# Patient Record
Sex: MALE | Race: WHITE | NOT HISPANIC OR LATINO | URBAN - METROPOLITAN AREA
[De-identification: names, ages, dates, MRNs, and addresses within clinical notes are randomized per-mention and may not be internally consistent; named-entity substitution may affect disease eponyms.]

---

## 2020-06-20 ENCOUNTER — HOSPITAL ENCOUNTER (INPATIENT)
Facility: HOSPITAL | Age: 40
LOS: 1 days | Discharge: HOME | End: 2020-06-21
Attending: EMERGENCY MEDICINE | Admitting: HOSPITALIST
Payer: MEDICAID

## 2020-06-20 ENCOUNTER — APPOINTMENT (EMERGENCY)
Dept: RADIOLOGY | Facility: HOSPITAL | Age: 40
End: 2020-06-20
Attending: EMERGENCY MEDICINE
Payer: MEDICAID

## 2020-06-20 DIAGNOSIS — I48.91 ATRIAL FIBRILLATION, UNSPECIFIED TYPE (CMS/HCC): Primary | ICD-10-CM

## 2020-06-20 PROBLEM — I10 ESSENTIAL HYPERTENSION: Status: ACTIVE | Noted: 2020-06-20

## 2020-06-20 LAB
ANION GAP SERPL CALC-SCNC: 13 MEQ/L (ref 3–15)
BASOPHILS # BLD: 0.03 K/UL (ref 0.01–0.1)
BASOPHILS NFR BLD: 0.7 %
BUN SERPL-MCNC: 6 MG/DL (ref 8–20)
CALCIUM SERPL-MCNC: 9.5 MG/DL (ref 8.9–10.3)
CHLORIDE SERPL-SCNC: 107 MEQ/L (ref 98–109)
CO2 SERPL-SCNC: 20 MEQ/L (ref 22–32)
CREAT SERPL-MCNC: 0.8 MG/DL (ref 0.8–1.3)
DIFFERENTIAL METHOD BLD: ABNORMAL
EOSINOPHIL # BLD: 0.11 K/UL (ref 0.04–0.54)
EOSINOPHIL NFR BLD: 2.5 %
ERYTHROCYTE [DISTWIDTH] IN BLOOD BY AUTOMATED COUNT: 12.5 % (ref 11.6–14.4)
GFR SERPL CREATININE-BSD FRML MDRD: >60 ML/MIN/1.73M*2
GLUCOSE SERPL-MCNC: 93 MG/DL (ref 70–99)
HCT VFR BLDCO AUTO: 43.4 % (ref 40.1–51)
HGB BLD-MCNC: 14.6 G/DL (ref 13.7–17.5)
IMM GRANULOCYTES # BLD AUTO: 0.01 K/UL (ref 0–0.08)
IMM GRANULOCYTES NFR BLD AUTO: 0.2 %
LYMPHOCYTES # BLD: 1.41 K/UL (ref 1.2–3.5)
LYMPHOCYTES NFR BLD: 31.8 %
MCH RBC QN AUTO: 33 PG (ref 28–33.2)
MCHC RBC AUTO-ENTMCNC: 33.6 G/DL (ref 32.2–36.5)
MCV RBC AUTO: 98 FL (ref 83–98)
MONOCYTES # BLD: 0.3 K/UL (ref 0.3–1)
MONOCYTES NFR BLD: 6.8 %
NEUTROPHILS # BLD: 2.58 K/UL (ref 1.7–7)
NEUTS SEG NFR BLD: 58 %
NRBC BLD-RTO: 0 %
PDW BLD AUTO: 9.7 FL (ref 9.4–12.4)
PLATELET # BLD AUTO: 216 K/UL (ref 150–350)
POTASSIUM SERPL-SCNC: 3.7 MEQ/L (ref 3.6–5.1)
RBC # BLD AUTO: 4.43 M/UL (ref 4.5–5.8)
SODIUM SERPL-SCNC: 140 MEQ/L (ref 136–144)
TROPONIN I SERPL-MCNC: <0.02 NG/ML
TSH SERPL DL<=0.05 MIU/L-ACNC: 1.47 MIU/L (ref 0.34–5.6)
WBC # BLD AUTO: 4.44 K/UL (ref 3.8–10.5)

## 2020-06-20 PROCEDURE — U0002 COVID-19 LAB TEST NON-CDC: HCPCS | Performed by: PHYSICIAN ASSISTANT

## 2020-06-20 PROCEDURE — 63600000 HC DRUGS/DETAIL CODE: Performed by: HOSPITALIST

## 2020-06-20 PROCEDURE — 21400000 HC ROOM AND CARE CCU/INTERMEDIATE

## 2020-06-20 PROCEDURE — 25000000 HC PHARMACY GENERAL: Performed by: HOSPITALIST

## 2020-06-20 PROCEDURE — 99285 EMERGENCY DEPT VISIT HI MDM: CPT | Mod: 25

## 2020-06-20 PROCEDURE — 96374 THER/PROPH/DIAG INJ IV PUSH: CPT

## 2020-06-20 PROCEDURE — 84443 ASSAY THYROID STIM HORMONE: CPT | Performed by: HOSPITALIST

## 2020-06-20 PROCEDURE — 84484 ASSAY OF TROPONIN QUANT: CPT | Performed by: PHYSICIAN ASSISTANT

## 2020-06-20 PROCEDURE — 63700000 HC SELF-ADMINISTRABLE DRUG: Performed by: HOSPITALIST

## 2020-06-20 PROCEDURE — 93005 ELECTROCARDIOGRAM TRACING: CPT | Performed by: PHYSICIAN ASSISTANT

## 2020-06-20 PROCEDURE — 25800000 HC PHARMACY IV SOLUTIONS: Performed by: PHYSICIAN ASSISTANT

## 2020-06-20 PROCEDURE — 63700000 HC SELF-ADMINISTRABLE DRUG: Performed by: INTERNAL MEDICINE

## 2020-06-20 PROCEDURE — 71046 X-RAY EXAM CHEST 2 VIEWS: CPT

## 2020-06-20 PROCEDURE — 85025 COMPLETE CBC W/AUTO DIFF WBC: CPT | Performed by: PHYSICIAN ASSISTANT

## 2020-06-20 PROCEDURE — 25000000 HC PHARMACY GENERAL: Performed by: PHYSICIAN ASSISTANT

## 2020-06-20 PROCEDURE — 99222 1ST HOSP IP/OBS MODERATE 55: CPT | Performed by: HOSPITALIST

## 2020-06-20 PROCEDURE — 36415 COLL VENOUS BLD VENIPUNCTURE: CPT | Performed by: EMERGENCY MEDICINE

## 2020-06-20 PROCEDURE — 80048 BASIC METABOLIC PNL TOTAL CA: CPT | Performed by: PHYSICIAN ASSISTANT

## 2020-06-20 RX ORDER — ENOXAPARIN SODIUM 100 MG/ML
40 INJECTION SUBCUTANEOUS
Status: DISCONTINUED | OUTPATIENT
Start: 2020-06-20 | End: 2020-06-21 | Stop reason: HOSPADM

## 2020-06-20 RX ORDER — SENNOSIDES 8.6 MG/1
1 TABLET ORAL 2 TIMES DAILY PRN
Status: DISCONTINUED | OUTPATIENT
Start: 2020-06-20 | End: 2020-06-21 | Stop reason: HOSPADM

## 2020-06-20 RX ORDER — DEXTROSE 50 % IN WATER (D50W) INTRAVENOUS SYRINGE
25 AS NEEDED
Status: DISCONTINUED | OUTPATIENT
Start: 2020-06-20 | End: 2020-06-21 | Stop reason: HOSPADM

## 2020-06-20 RX ORDER — ONDANSETRON 4 MG/1
4 TABLET, ORALLY DISINTEGRATING ORAL EVERY 8 HOURS PRN
Status: DISCONTINUED | OUTPATIENT
Start: 2020-06-20 | End: 2020-06-21 | Stop reason: HOSPADM

## 2020-06-20 RX ORDER — DILTIAZEM HCL IN NACL,ISO-OSM 125 MG/125
10 PLASTIC BAG, INJECTION (ML) INTRAVENOUS
Status: DISCONTINUED | OUTPATIENT
Start: 2020-06-20 | End: 2020-06-20

## 2020-06-20 RX ORDER — DILTIAZEM HCL IN NACL,ISO-OSM 125 MG/125
5-15 PLASTIC BAG, INJECTION (ML) INTRAVENOUS
Status: DISCONTINUED | OUTPATIENT
Start: 2020-06-20 | End: 2020-06-20

## 2020-06-20 RX ORDER — ALUMINUM HYDROXIDE, MAGNESIUM HYDROXIDE, AND SIMETHICONE 1200; 120; 1200 MG/30ML; MG/30ML; MG/30ML
30 SUSPENSION ORAL EVERY 4 HOURS PRN
Status: DISCONTINUED | OUTPATIENT
Start: 2020-06-20 | End: 2020-06-21 | Stop reason: HOSPADM

## 2020-06-20 RX ORDER — DILTIAZEM HYDROCHLORIDE 30 MG/1
30 TABLET, FILM COATED ORAL EVERY 6 HOURS
Status: DISCONTINUED | OUTPATIENT
Start: 2020-06-20 | End: 2020-06-21 | Stop reason: HOSPADM

## 2020-06-20 RX ORDER — IBUPROFEN 200 MG
16-32 TABLET ORAL AS NEEDED
Status: DISCONTINUED | OUTPATIENT
Start: 2020-06-20 | End: 2020-06-21 | Stop reason: HOSPADM

## 2020-06-20 RX ORDER — DILTIAZEM HYDROCHLORIDE 30 MG/1
30 TABLET, FILM COATED ORAL EVERY 6 HOURS
Status: DISCONTINUED | OUTPATIENT
Start: 2020-06-20 | End: 2020-06-20

## 2020-06-20 RX ORDER — DEXTROSE 40 %
15-30 GEL (GRAM) ORAL AS NEEDED
Status: DISCONTINUED | OUTPATIENT
Start: 2020-06-20 | End: 2020-06-21 | Stop reason: HOSPADM

## 2020-06-20 RX ORDER — ASPIRIN 325 MG
325 TABLET, DELAYED RELEASE (ENTERIC COATED) ORAL DAILY
Status: DISCONTINUED | OUTPATIENT
Start: 2020-06-20 | End: 2020-06-21 | Stop reason: HOSPADM

## 2020-06-20 RX ADMIN — ASPIRIN 325 MG: 325 TABLET, DELAYED RELEASE ORAL at 18:36

## 2020-06-20 RX ADMIN — ENOXAPARIN SODIUM 40 MG: 40 INJECTION SUBCUTANEOUS at 18:36

## 2020-06-20 RX ADMIN — Medication 10 MG/HR: at 18:37

## 2020-06-20 RX ADMIN — DILTIAZEM HYDROCHLORIDE 30 MG: 30 TABLET, FILM COATED ORAL at 22:15

## 2020-06-20 RX ADMIN — Medication 10 MG/HR: at 14:32

## 2020-06-20 RX ADMIN — SODIUM CHLORIDE 1000 ML: 9 INJECTION, SOLUTION INTRAVENOUS at 13:35

## 2020-06-20 SDOH — HEALTH STABILITY: MENTAL HEALTH: HOW OFTEN DO YOU HAVE SIX OR MORE DRINKS ON ONE OCCASION?: NEVER

## 2020-06-20 SDOH — HEALTH STABILITY: MENTAL HEALTH: HOW OFTEN DO YOU HAVE A DRINK CONTAINING ALCOHOL?: 2-4 TIMES A MONTH

## 2020-06-20 SDOH — HEALTH STABILITY: MENTAL HEALTH: HOW MANY DRINKS CONTAINING ALCOHOL DO YOU HAVE ON A TYPICAL DAY WHEN YOU ARE DRINKING?: 1 OR 2

## 2020-06-20 ASSESSMENT — COGNITIVE AND FUNCTIONAL STATUS - GENERAL
MOVING TO AND FROM BED TO CHAIR: 4 - NONE
TOILETING: 4 - NONE
DRESSING REGULAR UPPER BODY CLOTHING: 4 - NONE
STANDING UP FROM CHAIR USING ARMS: 4 - NONE
HELP NEEDED FOR BATHING: 4 - NONE
EATING MEALS: 4 - NONE
DRESSING REGULAR LOWER BODY CLOTHING: 4 - NONE
HELP NEEDED FOR PERSONAL GROOMING: 4 - NONE
WALKING IN HOSPITAL ROOM: 4 - NONE
CLIMB 3 TO 5 STEPS WITH RAILING: 4 - NONE

## 2020-06-20 NOTE — ASSESSMENT & PLAN NOTE
Patient with rapid A. fib.  Heart rate up to 120s per my discussion with PA in the emergency room.  Patient was started on a Cardizem drip and his heart rate is better controlled around 90s and he feels better.  No known history of A. fib.  Does feel anxious at times.    TSH within normal limits  Continue aspirin  Seen by cardiology today and I discussed with Dr. Pelletier as well.  Trial of oral flecainide to cardiovert.  Patient did go back into sinus rhythm this afternoon.  We will discharge the patient home with flecainide as needed  He will follow-up as an outpatient to pursue further studies which may include coronary calcium score, echocardiogram, stress test, sleep study and outpatient heart monitoring.  He will either follow-up with Dr. Pelletier or a cardiologist in [place] depending on his insurance situation.

## 2020-06-20 NOTE — ED ATTESTATION NOTE
I have personally seen and examined the patient.  I reviewed and agree with physician assistant / nurse practitioner’s assessment and plan of care.     Exam: Patient is currently resting comfortably no acute distress.  He is tachycardic with an irregularly irregular rhythm and no murmur.  Lungs are clear to auscultation without wheezes or rales.  Patient is awake alert and oriented and neurologically intact throughout.  Other than his tachycardia the remainder of his vital signs are stable and he is afebrile.    Plan: Patient presents with new onset rapid A. fib.  Patient denies chest pain or shortness of breath.  Will check labs including troponin.  Will treat with IV Cardizem and consult cardiology to evaluate.  Patient is known to Armand Harmon,   06/20/20 3183

## 2020-06-20 NOTE — PLAN OF CARE
Problem: Adult Inpatient Plan of Care  Goal: Plan of Care Review  Outcome: Progressing  Flowsheets (Taken 6/20/2020 9971)  Plan of Care Reviewed With: patient  Outcome Summary: On cardizem gtt at 10mg/hr

## 2020-06-20 NOTE — ASSESSMENT & PLAN NOTE
Had HTN in past and was on Ace-I.  Stopped a while ago due to improvement in BP.  Checks his BP at home daily and gets 130s/80s  + Intentional wt loss in last few months of ~15 lbs.  Blood pressures improved from yesterday.  Outpatient follow-up

## 2020-06-20 NOTE — NURSING NOTE
Patient received from ER on cardizem drip at 10mg/hr, HR in controlled Afib. Patient oriented to the room, questions answered.

## 2020-06-20 NOTE — H&P
Hospital Medicine Service -  History & Physical        CHIEF COMPLAINT   Rapid heart beat/palpitations     HISTORY OF PRESENT ILLNESS      Epifanio Vasquez is a 40 y.o. male with a past medical who presents with rapid atrial fibrillation.  He was in his usual state of health until about midnight last night.  He was going to bed and noticed some palpitations.  He has reported palpitations intermittently in the past which she attributes to stress.  He did get checked once and had an EKG in January which showed a sinus rhythm.  He was able to sleep and then woke up this morning still with palpitations.  He has a blood pressure cuff at home and checks his blood pressure daily.  When he checked his blood pressure this morning, he got around 130/80 but noticed the icon for irregular heartbeat was lit.    He continues to feel palpitations and feels like his chest was beating hard and just did not feel right.  He went to the urgent care center and they checked an EKG.  He was told that he had atrial flutter and was referred here to the emergency room.    He had an EKG in the emergency room suggesting A. fib.  He was started on a Cardizem drip and his heart rate is improving.  Per my discussion with the physician assistant in the emergency room, his heart rate was in the 120s.  During my interview his heart rate improved into the 90s.  His symptoms have improved as well.  He was denying any palpitations at this time.  He denied any chest pains or shortness of breath throughout.    He has no other symptoms.  He denies fevers, lightheadedness, dizziness, cough, urinary complaints.    He reports a history of hypertension and was on an ACE inhibitor but was stopped a while ago by his outpatient doctors due to improvement.  He also admits to recent intentional weight loss of 15 pounds.  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    PAST MEDICAL AND SURGICAL HISTORY      History reviewed. No pertinent past  medical history.    History reviewed. No pertinent surgical history.    PCP: [facility], No Pcp    MEDICATIONS      Prior to Admission medications    Not on File   NONE    ALLERGIES      Patient has no known allergies.    FAMILY HISTORY      Reviewed family history.  Father has A. fib and CAD    SOCIAL HISTORY      Social History     Socioeconomic History   • Marital status: Single     Spouse name: None   • Number of children: None   • Years of education: None   • Highest education level: None   Occupational History   • None   Social Needs   • Financial resource strain: None   • Food insecurity:     Worry: None     Inability: None   • Transportation needs:     Medical: None     Non-medical: None   Tobacco Use   • Smoking status: Never Smoker   • Smokeless tobacco: Never Used   Substance and Sexual Activity   • Alcohol use: Yes   • Drug use: Never   • Sexual activity: None   Lifestyle   • Physical activity:     Days per week: None     Minutes per session: None   • Stress: None   Relationships   • Social connections:     Talks on phone: None     Gets together: None     Attends [employer] service: None     Active member of club or organization: None     Attends meetings of clubs or organizations: None     Relationship status: None   • Intimate partner violence:     Fear of current or ex partner: None     Emotionally abused: None     Physically abused: None     Forced sexual activity: None   Other Topics Concern   • None   Social History Narrative   • None       REVIEW OF SYSTEMS      All other systems reviewed and negative except as noted in HPI    PHYSICAL EXAMINATION      Temp:  [36.7 °C (98.1 °F)] 36.7 °C (98.1 °F)  Heart Rate:  [] 79  Resp:  [18] 18  BP: (140-161)/() 161/94  There is no height or weight on file to calculate BMI.    Physical Exam   Visit Vitals  BP (!) 161/94   Pulse 79   Temp 36.7 °C (98.1 °F) (Oral)   Resp 18   SpO2 98%       Physical Exam  General Appearance:        Awake, Alert,  Oriented x3   Head:    Normocephalic, without obvious abnormality, atraumatic   Eyes:    PERRL, EOM's intact, No icterus   Throat:   Mucous membranes moist   Respiratory:     Clear to auscultation bilaterally   Cardiovascular:    Regular rate with an irregular rhythm   GI:     Soft, non-tender, bowel sounds active    Musculoskeletal   Symmetric strength bilateral upper and lower ext   Skin:   No diffuse rash on exposed areas of skin   Neurologic:  Psychiatric   CNII-XII grossly intact.     cooperative, slightly anxious         LABS / IMAGING / EKG        Labs  Results from last 7 days   Lab Units 06/20/20  1331   WBC K/uL 4.44   HEMOGLOBIN g/dL 14.6   HEMATOCRIT % 43.4   PLATELETS K/uL 216             Imaging  X-ray-No acute infectious or inflammatory lung consolidations    ECG/Telemetry  EKG from today was independent reviewed by me.  Shows atrial fibrillation at 104 bpm.    ASSESSMENT AND PLAN           * Atrial fibrillation (CMS/HCC)  Assessment & Plan  Patient with rapid A. fib.  Heart rate up to 120s per my discussion with PA in the emergency room.  Patient was started on a Cardizem drip and his heart rate is better controlled around 90s and he feels better.  No known history of A. fib.  Does feel anxious at times.  Check TSH.  Denies recent alcohol use.  Denies any drug use.  No respiratory symptoms.  Eventual echo-consider for Monday if patient still here.  CHADS2 VASC score is 0-1  Start aspirin  Check lipids and hepatic function panel  Cardiology consult.  Discussed potential management options with patient depending on his clinical course which could include spontaneous cardioversion, chemical/electrical cardioversion, rate control, ablation, anticoagulation    Essential hypertension  Assessment & Plan  Had HTN in past and was on Ace-I.  Stopped a while ago due to improvement in BP.  Checks his BP at home daily and gets 130s/80s  + Intentional wt loss in last few months of ~15 lbs.  Blood pressure elevated  here.  Could also be due to anxiety.  He is on a Cardizem drip.  Continue to monitor blood pressure.  He may eventually end up on an oral agent for A. fib which may also be effective in blood pressure management         VTE Assessment: [place] VTE Score: 0  VTE Prophylaxis Plan: lovenox  Code Status: Full Code  Estimated Discharge Date: 6/22/2020  Disposition Planning: home     Ashok Valente,   6/20/2020

## 2020-06-21 VITALS
TEMPERATURE: 98.1 F | RESPIRATION RATE: 16 BRPM | DIASTOLIC BLOOD PRESSURE: 82 MMHG | BODY MASS INDEX: 22.35 KG/M2 | HEIGHT: 72 IN | OXYGEN SATURATION: 97 % | SYSTOLIC BLOOD PRESSURE: 138 MMHG | HEART RATE: 62 BPM | WEIGHT: 165 LBS

## 2020-06-21 PROBLEM — E78.5 HYPERLIPIDEMIA: Status: ACTIVE | Noted: 2020-06-21

## 2020-06-21 LAB
ALBUMIN SERPL-MCNC: 4.2 G/DL (ref 3.4–5)
ALP SERPL-CCNC: 45 IU/L (ref 35–126)
ALT SERPL-CCNC: 18 IU/L (ref 16–63)
ANION GAP SERPL CALC-SCNC: 8 MEQ/L (ref 3–15)
APTT PPP: 35 SEC (ref 23–35)
AST SERPL-CCNC: 22 IU/L (ref 15–41)
ATRIAL RATE: 119
BILIRUB SERPL-MCNC: 0.9 MG/DL (ref 0.3–1.2)
BUN SERPL-MCNC: 5 MG/DL (ref 8–20)
CALCIUM SERPL-MCNC: 9.6 MG/DL (ref 8.9–10.3)
CHLORIDE SERPL-SCNC: 104 MEQ/L (ref 98–109)
CHOLEST SERPL-MCNC: 257 MG/DL
CO2 SERPL-SCNC: 27 MEQ/L (ref 22–32)
CREAT SERPL-MCNC: 0.8 MG/DL (ref 0.8–1.3)
ERYTHROCYTE [DISTWIDTH] IN BLOOD BY AUTOMATED COUNT: 12.5 % (ref 11.6–14.4)
GFR SERPL CREATININE-BSD FRML MDRD: >60 ML/MIN/1.73M*2
GLUCOSE SERPL-MCNC: 87 MG/DL (ref 70–99)
HCT VFR BLDCO AUTO: 42.4 % (ref 40.1–51)
HDLC SERPL-MCNC: 86 MG/DL
HDLC SERPL: 3 {RATIO}
HGB BLD-MCNC: 14.2 G/DL (ref 13.7–17.5)
INR PPP: 1.1 INR
LDLC SERPL CALC-MCNC: 159 MG/DL
MCH RBC QN AUTO: 32.8 PG (ref 28–33.2)
MCHC RBC AUTO-ENTMCNC: 33.5 G/DL (ref 32.2–36.5)
MCV RBC AUTO: 97.9 FL (ref 83–98)
NONHDLC SERPL-MCNC: 171 MG/DL
PDW BLD AUTO: 10 FL (ref 9.4–12.4)
PLATELET # BLD AUTO: 207 K/UL (ref 150–350)
POTASSIUM SERPL-SCNC: 4 MEQ/L (ref 3.6–5.1)
PROT SERPL-MCNC: 6.7 G/DL (ref 6–8.2)
PROTHROMBIN TIME: 14 SEC (ref 12.2–14.5)
QRS DURATION: 72
QT INTERVAL: 358
QTC CALCULATION(BAZETT): 470
R AXIS: -22
RBC # BLD AUTO: 4.33 M/UL (ref 4.5–5.8)
SARS-COV-2 RNA RESP QL NAA+PROBE: NOT DETECTED
SODIUM SERPL-SCNC: 139 MEQ/L (ref 136–144)
T WAVE AXIS: 7
TRIGL SERPL-MCNC: 58 MG/DL (ref 30–149)
VENTRICULAR RATE: 104
WBC # BLD AUTO: 6.4 K/UL (ref 3.8–10.5)

## 2020-06-21 PROCEDURE — 63700000 HC SELF-ADMINISTRABLE DRUG: Performed by: INTERNAL MEDICINE

## 2020-06-21 PROCEDURE — 85730 THROMBOPLASTIN TIME PARTIAL: CPT | Performed by: HOSPITALIST

## 2020-06-21 PROCEDURE — 85027 COMPLETE CBC AUTOMATED: CPT | Performed by: HOSPITALIST

## 2020-06-21 PROCEDURE — 36415 COLL VENOUS BLD VENIPUNCTURE: CPT | Performed by: HOSPITALIST

## 2020-06-21 PROCEDURE — 63700000 HC SELF-ADMINISTRABLE DRUG: Performed by: HOSPITALIST

## 2020-06-21 PROCEDURE — 99239 HOSP IP/OBS DSCHRG MGMT >30: CPT | Performed by: HOSPITALIST

## 2020-06-21 PROCEDURE — 85610 PROTHROMBIN TIME: CPT | Performed by: HOSPITALIST

## 2020-06-21 PROCEDURE — 80061 LIPID PANEL: CPT | Performed by: HOSPITALIST

## 2020-06-21 PROCEDURE — 80053 COMPREHEN METABOLIC PANEL: CPT | Performed by: HOSPITALIST

## 2020-06-21 PROCEDURE — 93005 ELECTROCARDIOGRAM TRACING: CPT | Performed by: HOSPITALIST

## 2020-06-21 RX ORDER — ASPIRIN 325 MG
325 TABLET, DELAYED RELEASE (ENTERIC COATED) ORAL DAILY
Qty: 30 TABLET | Refills: 0 | Status: SHIPPED | OUTPATIENT
Start: 2020-06-22 | End: 2020-07-22

## 2020-06-21 RX ORDER — FLECAINIDE ACETATE 100 MG/1
200 TABLET ORAL DAILY PRN
Qty: 30 TABLET | Refills: 0 | Status: SHIPPED | OUTPATIENT
Start: 2020-06-21 | End: 2020-07-21

## 2020-06-21 RX ORDER — FLECAINIDE ACETATE 100 MG/1
200 TABLET ORAL ONCE
Status: COMPLETED | OUTPATIENT
Start: 2020-06-21 | End: 2020-06-21

## 2020-06-21 RX ADMIN — ASPIRIN 325 MG: 325 TABLET, DELAYED RELEASE ORAL at 08:46

## 2020-06-21 RX ADMIN — DILTIAZEM HYDROCHLORIDE 30 MG: 30 TABLET, FILM COATED ORAL at 05:43

## 2020-06-21 RX ADMIN — FLECAINIDE ACETATE 200 MG: 100 TABLET ORAL at 11:35

## 2020-06-21 ASSESSMENT — ENCOUNTER SYMPTOMS
DIZZINESS: 0
FEVER: 0
ABDOMINAL PAIN: 0
CHILLS: 0
SHORTNESS OF BREATH: 0
PALPITATIONS: 1
COUGH: 0
LIGHT-HEADEDNESS: 0
NAUSEA: 0
VOMITING: 0
FATIGUE: 1

## 2020-06-21 NOTE — ASSESSMENT & PLAN NOTE
Reviewed results with patient.   He exercises already and not overweight.  T/c calcium score. I encouraged him to d/w his cardiologist.

## 2020-06-21 NOTE — CONSULTS
"Cardiology Consult Note  Subjective     Epifanio Vasquez is a 40 y.o. male who was admitted for Atrial fibrillation, unspecified type (CMS/HCC) [I48.91]. Epifanio Vasquez was  for management recommendations. Epifanio Vasquez is a 41 yo M with h/o borderline hypertension and hyperlipidemia who presents with new onset rapid Afib. 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    He started having palpitations on the evening of 6/19, went to sleep and woke up with continued palpitations yesterday AM. Found to be in Afib in the 120s in the ED. Initially started on cardizem gtt, switched to cardizem 30 po q6h and heart rates reasonably controlled overnight.    He has had intermittent palpitations over the years in retrospect. Typically brief, lasting a few minutes at a time -- attributed to a \"panic attack\" in Jan when he went to urgent care. He has also noticed mild brief palpitations the morning after drinking alcohol over the years.     Outside records reviewed..    History reviewed. No pertinent past medical history.    History reviewed. No pertinent surgical history.    Social History     Social History Narrative   • Not on file       History reviewed. No pertinent family history.    Patient has no known allergies.    Inpatient medications:  •  alum-mag hydroxide-simeth, 30 mL, oral, q4h PRN  •  aspirin, 325 mg, oral, Daily  •  glucose, 16-32 g of dextrose, oral, PRN **OR** dextrose, 15-30 g of dextrose, oral, PRN **OR** glucagon, 1 mg, intramuscular, PRN **OR** dextrose in water, 25 mL, intravenous, PRN  •  dilTIAZem, 30 mg, oral, q6h JUSTUS  •  enoxaparin, 40 mg, subcutaneous, Daily (6p)  •  flecainide, 200 mg, oral, Once  •  ondansetron ODT, 4 mg, oral, q8h PRN  •  senna, 1 tablet, oral, 2x daily PRN    Outpatient medications: Reviewed.    Review of Systems  Pertinent items are noted in HPI.    Objective     Labs   Lab Results   Component Value Date    WBC 6.40 " "06/21/2020    HGB 14.2 06/21/2020    HCT 42.4 06/21/2020     06/21/2020    CHOL 257 (H) 06/21/2020    TRIG 58 06/21/2020    HDL 86 06/21/2020    ALT 18 06/21/2020    AST 22 06/21/2020     06/21/2020    K 4.0 06/21/2020     06/21/2020    CREATININE 0.8 06/21/2020    BUN 5 (L) 06/21/2020    CO2 27 06/21/2020    TSH 1.47 06/20/2020    INR 1.1 06/21/2020       Imaging  I have independently reviewed the pertinent imaging from the last 24 hrs.    ECG/Telemetry  EKG 6/20/20 at 1320: Afib at 104 bpm    Physical Exam:  Visit Vitals  /78 (BP Location: Left upper arm, Patient Position: Lying)   Pulse 70   Temp 36.6 °C (97.8 °F) (Oral)   Resp 18   Ht 1.829 m (6')   Wt 74.8 kg (165 lb)   SpO2 100%   BMI 22.38 kg/m²        GEN - No acute distress  HEENT - Oropharynx clear, moist mucous membranes  CV - Regular S1,S2, No murmurs, gallops or rubs. No carotid bruits.  PULM - Clear to auscultation bilaterally  ABD - Soft, Non-tender, non-distended. +bowel sounds  EXT - No clubbing, cyanosis or edema  PSYCH - Awake, alert, oriented x 3  SKIN - Warm/Dry.      Assessment   40 y.o. male being consulted for management recommendations  Hyperlipidemia  Assessment & Plan    Would plan for eventual CCS as outpatient    Essential hypertension  Assessment & Plan  BP elevated on admission due to anxiety  Well controlled currently on po cardizem  If he converts to sinus rhythm, will likely not be able to tolerate cardizem due to baseline sinus bradycardia in the past  Would monitor off meds    * Atrial fibrillation (CMS/[facility])  Assessment & Plan  New onset Afib with rapid rates in the 120s on presentation in the ED  Currently reasonably rate controlled on cardizem 30 q6h  He has had intermittent symptoms of palpitations over the years in retrospect. Urgent care visit in Jan with palpitations dx as \"anxiety attack\", was likely transient Afib  Will try flecainide 200mg x 1 now --> if he remains in Afib, plan for " "BRIGITTE-guided cardioversion tomorrow (and then 1 month of AC)  If he converts with flecainide, would plan for DC home today with aspirin 325mg daily (given CHADS-VASc score of 0) and \"pill in pocket\" flecainide 200mg x 1 PRN for palpitations.     He will need followup for echo, stress test, sleep study and outpatient heart monitoring  If recurrent episodes, low threshold to pursue ablation in young, healthy patient to avoid long-term antiarrhythmics  With his out of state insurance, he plans to followup in [facility] for this            Saniya Pelletier MD  6/21/2020    "

## 2020-06-21 NOTE — ED PROVIDER NOTES
"HPI     Chief Complaint   Patient presents with   • Atrial Flutter       40-year-old male no past medical history presents ED for palpitations.  Patient reports since midnight he has been experiencing sudden onset of palpitations and \"feeling unwell\".  Patient reports he is had episodes in the past with his palpitations but has not had a diagnosis.  Patient reports he is from [facility] visiting his father.  He was outside today gardening and yesterday was doing some exercise which she has not done in significant bedtime although at baseline patient does exercise regularly.  Patient reports he noticed palpitations when attempting to go to bed.  Reports they came on and off throughout the day today.  He went to urgent care who referred him to ED for possible new onset A. fib.  Patient reports at time of ED he does feel a \"anxious\" feeling with associated palpitations.  He denies any chest pain, shortness of breath, cough, fevers, chills, lightheadedness or dizziness.    Father has a history of A. fib.  Patient is never seen a cardiologist before.           Patient History     History reviewed. No pertinent past medical history.    History reviewed. No pertinent surgical history.    History reviewed. No pertinent family history.    Social History     Tobacco Use   • Smoking status: Never Smoker   • Smokeless tobacco: Never Used   Substance Use Topics   • Alcohol use: Yes     Frequency: 2-4 times a month     Drinks per session: 1 or 2     Binge frequency: Never   • Drug use: Never       Systems Reviewed from Nursing Triage:  Meds          Review of Systems     Review of Systems   Constitutional: Positive for fatigue. Negative for chills and fever.   Respiratory: Negative for cough and shortness of breath.    Cardiovascular: Positive for palpitations. Negative for chest pain and leg swelling.   Gastrointestinal: Negative for abdominal pain, nausea and vomiting.   Neurological: Negative for dizziness and " light-headedness.        Physical Exam     ED Triage Vitals   Temp Heart Rate Resp BP SpO2   06/20/20 1340 06/20/20 1340 06/20/20 1340 06/20/20 1340 06/20/20 1340   36.7 °C (98.1 °F) (!) 107 18 (!) 151/111 100 %      Temp Source Heart Rate Source Patient Position BP Location FiO2 (%) (Set)   06/20/20 1340 06/20/20 1636 06/20/20 1636 06/20/20 1340 --   Oral Monitor Lying Right upper arm        Pulse Ox %: 100 % (06/20/20 1356)  Pulse Ox Interpretation: Normal (06/20/20 1356)  Heart Rate: 107 (06/20/20 1356)  Rhythm Strip Interpretation: Atrial Fibrillation (06/20/20 1356)    Patient Vitals for the past 24 hrs:   BP Temp Temp src Pulse Resp SpO2 Height Weight   06/20/20 2200 129/72 36.8 °C (98.2 °F) Oral 60 18 98 % -- --   06/20/20 2145 -- -- -- (!) 53 -- -- -- --   06/20/20 1945 -- 37.1 °C (98.7 °F) Oral -- 18 98 % -- --   06/20/20 1943 -- -- -- 62 -- -- -- --   06/20/20 1922 125/76 -- -- -- -- -- -- --   06/20/20 1700 133/85 37 °C (98.6 °F) Oral 70 18 96 % 1.829 m (6') 74.8 kg (165 lb)   06/20/20 1636 -- 37 °C (98.6 °F) Oral -- 18 96 % -- --   06/20/20 1621 (!) 161/94 -- Oral 79 18 98 % -- --   06/20/20 1503 140/88 -- -- 84 18 99 % -- --   06/20/20 1432 (!) 148/106 -- -- (!) 105 -- -- -- --   06/20/20 1340 (!) 151/111 36.7 °C (98.1 °F) Oral (!) 107 18 100 % -- --                                          Physical Exam   Constitutional: He appears well-developed and well-nourished.   Well-appearing healthy male slightly anxious upon examination.   HENT:   Head: Normocephalic and atraumatic.   Eyes: Conjunctivae are normal.   Neck: Neck supple.   Cardiovascular: Normal rate and regular rhythm.   No murmur heard.  Pulmonary/Chest: Effort normal and breath sounds normal. No respiratory distress.   Abdominal: Soft. There is no tenderness.   Musculoskeletal: He exhibits no edema.   Neurological: He is alert.   Skin: Skin is warm and dry.   Psychiatric: He has a normal mood and affect.   Nursing note and vitals  reviewed.           Procedures    Labs Reviewed   CBC AND DIFF - Abnormal       Result Value    WBC 4.44      RBC 4.43 (*)     Hemoglobin 14.6      Hematocrit 43.4      MCV 98.0      MCH 33.0      MCHC 33.6      RDW 12.5      Platelets 216      MPV 9.7      Differential Type Auto      nRBC 0.0      Immature Granulocytes 0.2      Neutrophils 58.0      Lymphocytes 31.8      Monocytes 6.8      Eosinophils 2.5      Basophils 0.7      Immature Granulocytes, Absolute 0.01      Neutrophils, Absolute 2.58      Lymphocytes, Absolute 1.41      Monocytes, Absolute 0.30      Eosinophils, Absolute 0.11      Basophils, Absolute 0.03     BASIC METABOLIC PANEL - Abnormal    Sodium 140      Potassium 3.7      Chloride 107      CO2 20 (*)     BUN 6 (*)     Creatinine 0.8      Glucose 93      Calcium 9.5      eGFR >60.0      Anion Gap 13     TROPONIN I - Normal    Troponin I <0.02     TSH 3RD GENERATION W/REFLEX FT4 - Normal    TSH 1.47     SARS-COV-2 (COVID 19), PCR    Narrative:     The following orders were created for panel order SARS-CoV-2 (COVID-19), PCR Nasopharynx.  Procedure                               Abnormality         Status                     ---------                               -----------         ------                     COVID-19 [facility] SARS-COV-...[[id number]]                      In process                   Please view results for these tests on the individual orders.   RAINBOW DRAW PANEL    Narrative:     The following orders were created for panel order [place] Draw Panel.  Procedure                               Abnormality         Status                     ---------                               -----------         ------                     RAINBOW LAVENDER[[id number]]                                 Final result               RAINBOW LT BLUE[[id number]]                                  Final result               RAINBOW LT GREEN[[id number]]                                 Final result               RAINBOW  LT GREEN[[id number]]                                 Final result                 Please view results for these tests on the individual orders.   RAINBOW DRAW PANEL    Narrative:     The following orders were created for panel order [place] Draw Panel.  Procedure                               Abnormality         Status                     ---------                               -----------         ------                     RAINBOW RED[[id number]]                                                                 RAINBOW LAVENDER[[id number]]                                                            RAINBOW LT BLUE[[id number]]                                                             RAINBOW LT GREEN[[id number]]                                                            RAINBOW LT GREEN[[id number]]                                                              Please view results for these tests on the individual orders.   COVID-19 [facility] SARS-COV-2 RNA, QUALITATIVE NAAT   RAINBOW LAVENDER   RAINBOW LT BLUE   RAINBOW LT GREEN   RAINBOW LT GREEN   RAINBOW RED   RAINBOW LAVENDER   RAINBOW LT BLUE   RAINBOW LT GREEN   RAINBOW LT GREEN       X-RAY CHEST 2 VIEWS   Final Result   IMPRESSION:      No acute infectious or inflammatory lung consolidations..      ECG 12 lead                     ED Course & MDM     MDM         ED Course as of Jun 20 2315   Sat Jun 20, 2020   1418 Patient presents with rapid A. fib which is new onset beginning yesterday.  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    [SACHI]   1418 Will treat with Cardizem infusion.    [SACHI]   1418 Will check labs including troponin.    [SACHI]   1418 Will consult cardiology to evaluate    [SACHI]   1428 IMPRESSION:     No acute infectious or inflammatory lung consolidations..    [EB]   1449 Dr. Nicholas d/w Dr. Lopes. Will admit under Dr. Lopes with [facility] doing admit orders.     [EB]   1451 Patient remains in the low 100s and irregularly irregular on  the Cardizem drip.  We will continue the drip as per discussion with Dr. Lopes from cardiology.  He recommends hospital admission for formal work-up and rule out    [SACHI]   9411 Critical care time 35 minutes provided by me    [SACHI]   9014 Page to [facility]    [EB]      ED Course User Index  [EB] Nuria Hoffman PA C  [SACHI] Armand Nicholas,          Clinical Impressions as of Jun 20 2310   Atrial fibrillation, unspecified type (CMS/HCC)        Nuria Hoffman PA C  06/21/20 1141

## 2020-06-21 NOTE — PLAN OF CARE
Problem: Adult Inpatient Plan of Care  Goal: Plan of Care Review  Outcome: Progressing  Flowsheets  Taken 6/21/2020 0583  Progress: no change  Outcome Summary: Cardizem gtt D/C d/t low heart rate, PO cardizem started.  Pt continues with Afib.  Taken 6/20/2020 3554  Plan of Care Reviewed With: patient

## 2020-06-21 NOTE — DISCHARGE INSTRUCTIONS
Follow up with a cardiologist within a week for further testing which may include coronary calcium score, echocardiogram, stress test, sleep study and outpatient heart monitoring.      Try to avoid alcohol and caffeine until you get other tests and discuss with cardiology.

## 2020-06-21 NOTE — NURSING NOTE
HR sustaining in the 50s.  Notified cardiology.  Dr. Lopes called back and gave verbal orders to orders start pt on PO cardizem 30mg Q6 hours starting now and then to turn off gtt in 30mins.  Verbal orders entered with readback given.  Readback confirmed.  Dr. Mahajan paged and made aware of new orders.

## 2020-06-21 NOTE — DISCHARGE SUMMARY
Hospital Medicine Service -  Inpatient Discharge Summary        BRIEF OVERVIEW   Admitting Provider: Ashok Valente DO  Attending Provider: Ashok Valente DO Attending phys phone: [phone]    PCP: Edin States, No Pcp [phone]    Admission Date: 6/20/2020  Discharge Date: 6/21/2020     DISCHARGE DIAGNOSES      Primary Discharge Diagnosis  Atrial fibrillation (CMS/HCC)    Secondary Discharge Diagnoses  Active Hospital Problems    Diagnosis Date Noted   • Atrial fibrillation (CMS/HCC) 06/20/2020     Priority: High   • Hyperlipidemia 06/21/2020   • Essential hypertension 06/20/2020      Resolved Hospital Problems   No resolved problems to display.       Problem List on Day of Discharge  * Atrial fibrillation (CMS/HCC)  Assessment & Plan  Patient with rapid A. fib.  Heart rate up to 120s per my discussion with PA in the emergency room.  Patient was started on a Cardizem drip and his heart rate is better controlled around 90s and he feels better.  No known history of A. fib.  Does feel anxious at times.    TSH within normal limits  Continue aspirin  Seen by cardiology today and I discussed with Dr. Pelletier as well.  Trial of oral flecainide to cardiovert.  Patient did go back into sinus rhythm this afternoon.  We will discharge the patient home with flecainide as needed  He will follow-up as an outpatient to pursue further studies which may include coronary calcium score, echocardiogram, stress test, sleep study and outpatient heart monitoring.  He will either follow-up with Dr. Pelletier or a cardiologist in [place] depending on his insurance situation.    Hyperlipidemia  Assessment & Plan  Reviewed results with patient.   He exercises already and not overweight.  T/c calcium score. I encouraged him to d/w his cardiologist.    Essential hypertension  Assessment & Plan  Had HTN in past and was on Ace-I.  Stopped a while ago due to improvement in BP.  Checks his BP at home daily and gets 130s/80s  +  Intentional wt loss in last few months of ~15 lbs.  Blood pressures improved from yesterday.  Outpatient follow-up    SUMMARY OF HOSPITALIZATION      Presenting Problem/History of Present Illness  Atrial fibrillation, unspecified type (CMS/HCC)    This is a 40 y.o. year-old male admitted on 6/20/2020 with Atrial fibrillation, unspecified type (CMS/HCC) [I48.91].       Hospital Course  The patient is a 39 yo male who presented with palpitations and was found to be in rapid afib.  He was started on cardizem drip and rate became controlled.  Had some bradycardia overnight so drip turned off and started on po cdzm.  Seen by cardiology this am and they reviewed management options.  The prescribed 1 dose of flecainide 200 mg and pt converted to sinus with a normal rate this afternoon-confirmed with repeat EKG.  I d/w Dr. Pelletier earlier today.  Since pt converted, will dc him home with prn flecainide and daily ASA 325mg (CHADS2 VASC score = 0).    He will need to follow up as outpt for further evaluation and might do so in [place] where he lives due to insurance issues.    I advised him to avoid alcohol and caffeine just in case until he is evaluated by cardiology.    Patient is stable for discharge today-sooner than originally expected since he converted with oral antiarrhythmic.    Discussed at length with patient today and reviewed medications and instructions.  Also d/w nursing and cardiology.  Total time to coordinate discharge is 35 -40 minutes    Exam on Day of Discharge  Physical Exam   Visit Vitals  /82 (BP Location: Left upper arm, Patient Position: Sitting)   Pulse 62   Temp 36.7 °C (98.1 °F) (Oral)   Resp 16   Ht 1.829 m (6')   Wt 74.8 kg (165 lb)   SpO2 97%   BMI 22.38 kg/m²       Physical Exam  General Appearance:        Awake, Alert, Oriented x3   Head:    Normocephalic   Eyes:    No icterus   Respiratory:     Clear to auscultation bilaterally   Cardiovascular:    Regular rate and irregular rhythm  (from my exam at 8:00 am today)   GI:     Soft, non-tender, bowel sounds active    Psychiatric   Appropriate, cooperative         Consults During Admission  IP CONSULT TO CARDIOLOGY  IP CONSULT TO CASE MANAGEMENT    DISCHARGE MEDICATIONS        Medication List      START taking these medications    aspirin 325 mg EC tablet  Start taking on:  June 22, 2020  Take 1 tablet (325 mg total) by mouth daily.  Dose:  325 mg     flecainide 100 mg tablet  Commonly known as:  TAMBOCOR  Take 2 tablets (200 mg total) by mouth daily as needed (palpitations/rapid and irregular heart rate/atrial fibrillation).  Dose:  200 mg            Instructions for after discharge     Discharge diet      Diet Type / Texture:  Regular    Follow-up with primary physician (PCP)      Within 1 week. Call for appt    Other Follow-up      Follow up with a cardiologist as soon as feasible (within a week)  for further testing and management of your afib.  Call for appointment    Post-Discharge Activity: Normal activity as tolerated.      Normal activity as tolerated.             PROCEDURES / LABS / IMAGING      Operative Procedures  none    Pertinent Labs  Results from last 7 days   Lab Units 06/21/20  0421   WBC K/uL 6.40   HEMOGLOBIN g/dL 14.2   HEMATOCRIT % 42.4   PLATELETS K/uL 207     Results from last 7 days   Lab Units 06/21/20  0421   SODIUM mEQ/L 139   POTASSIUM mEQ/L 4.0   CHLORIDE mEQ/L 104   CO2 mEQ/L 27   BUN mg/dL 5*   CREATININE mg/dL 0.8   GLUCOSE mg/dL 87   CALCIUM mg/dL 9.6         Pertinent Imaging  X-ray Chest 2 Views    Result Date: 6/20/2020  IMPRESSION: No acute infectious or inflammatory lung consolidations..      OUTPATIENT  FOLLOW-UP / REFERRALS / PENDING TESTS        Outpatient Follow-Up Appointments  Encounter Information     You do not currently have any appointments scheduled.          Referrals  No orders of the defined types were placed in this encounter.      Test Results Pending at Discharge  Unresulted Labs (From  admission, onward)     Start     Ordered    06/20/20 1326  [facility] Draw Panel  STAT     Question Answer Comment   Red Top 1 Label    Light Green Top 2 Labels    Gold Top No Labels    Light Blue 1 Label    Lavender Top 1 Label    Pink Top No Labels    Yellow - Urine Tall No Labels    Urine Culture Tube No Labels    Blood Culture No Labels        06/20/20 1326                Important Issues to Address in Follow-Up  Follow up with cardiology for additional testing/management which may include coronary calcium score, echocardiogram, stress test, sleep study and outpatient heart monitoring    DISCHARGE DISPOSITION      Disposition: home    Code Status At Discharge: Full Code    Physician Order for Life-Sustaining Treatment Document Status      No documents found                 Ashok Valente,   6/21/2020

## 2020-06-21 NOTE — ASSESSMENT & PLAN NOTE
"New onset Afib with rapid rates in the 120s on presentation in the ED  Currently reasonably rate controlled on cardizem 30 q6h  He has had intermittent symptoms of palpitations over the years in retrospect. Urgent care visit in Jan with palpitations dx as \"anxiety attack\", was likely transient Afib  Will try flecainide 200mg x 1 now --> if he remains in Afib, plan for BRIGITTE-guided cardioversion tomorrow (and then 1 month of AC)  If he converts with flecainide, would plan for DC home today with aspirin 325mg daily (given CHADS-VASc score of 0) and \"pill in pocket\" flecainide 200mg x 1 PRN for palpitations.     He will need followup for echo, stress test, sleep study and outpatient heart monitoring  If recurrent episodes, low threshold to pursue ablation in young, healthy patient to avoid long-term antiarrhythmics  With his out of state insurance, he plans to followup in [facility] for this  "

## 2020-06-21 NOTE — ASSESSMENT & PLAN NOTE
BP elevated on admission due to anxiety  Well controlled currently on po cardizem  If he converts to sinus rhythm, will likely not be able to tolerate cardizem due to baseline sinus bradycardia in the past  Would monitor off meds

## 2020-06-21 NOTE — NURSING NOTE
Pt heart rhythm converted to Sinus angela/SR Pr.22,QRS .12,QT.40 Pt vss,states he feels the difference and better in SR. Dr Valente aware , po cardizem held.12 lead EKG completed.

## 2020-06-22 LAB
ATRIAL RATE: 65
P AXIS: 66
PR INTERVAL: 172
QRS DURATION: 90
QT INTERVAL: 408
QTC CALCULATION(BAZETT): 424
R AXIS: -58
T WAVE AXIS: 38
VENTRICULAR RATE: 65

## 2020-06-24 NOTE — UM PHYSICIAN REVIEW NOTE
Accept downgrade for this 39 yo male admitted for short stay with afib with rates controlled on diltiazem drip transitioned to PO. No appeal.

## 2022-05-15 ENCOUNTER — EMERGENCY (EMERGENCY)
Facility: HOSPITAL | Age: 42
LOS: 1 days | Discharge: HOME | End: 2022-05-15
Admitting: EMERGENCY MEDICINE
Payer: MEDICAID

## 2022-05-15 VITALS
TEMPERATURE: 98 F | WEIGHT: 164.91 LBS | SYSTOLIC BLOOD PRESSURE: 160 MMHG | OXYGEN SATURATION: 99 % | HEART RATE: 91 BPM | DIASTOLIC BLOOD PRESSURE: 90 MMHG | RESPIRATION RATE: 18 BRPM

## 2022-05-15 PROCEDURE — 99284 EMERGENCY DEPT VISIT MOD MDM: CPT | Mod: 25

## 2022-05-15 PROCEDURE — 74176 CT ABD & PELVIS W/O CONTRAST: CPT | Mod: 26

## 2022-05-15 PROCEDURE — 71046 X-RAY EXAM CHEST 2 VIEWS: CPT | Mod: 26

## 2022-05-15 NOTE — ED PROVIDER NOTE - OBJECTIVE STATEMENT
41 yo male s/p mechanical fall in setting of alcohol ingestion 5 days ago, c/o left flank pain and ecchymosis that is improving. vomited once five days ago that he attributed to food poisoning, no vomiting since. no fever/chills. no cp/sob. no head trauma.

## 2022-05-15 NOTE — ED ADULT NURSE NOTE - OBJECTIVE STATEMENT
Pt presents to ED complaining of injury sustained to L side while intoxicated last week. Flank appears ecchymotic, that has since improved according to pt. Denies other trauma sustained with fall.

## 2022-05-15 NOTE — ED PROVIDER NOTE - PHYSICAL EXAMINATION
CONSTITUTIONAL: Well-appearing; well-nourished; in no apparent distress.   	HEAD: Normocephalic; atraumatic.   	EYES:  conjunctiva and sclera clear  	ENT: normal nose; no rhinorrhea; normal pharynx with no erythema or lesions.   	NECK: Supple; non-tender;   	CARDIOVASCULAR: Normal S1, S2; no murmurs, rubs, or gallops. Regular rate and rhythm.   	RESPIRATORY: Breathing easily; breath sounds clear and equal bilaterally; no wheezes, rhonchi, or rales.  	GI: Soft; non-distended; non-tender. +left flank ecchymosis with tenderness.   	EXT: No cyanosis or edema; N/V intact  	SKIN: Normal for age and race; warm; dry; good turgor; no apparent lesions or rash.   	NEURO: A & O x 3; face symmetric; grossly unremarkable.   PSYCHOLOGICAL: The patient’s mood and manner are appropriate.

## 2022-05-15 NOTE — ED PROVIDER NOTE - PATIENT PORTAL LINK FT
Left arm;
You can access the FollowMyHealth Patient Portal offered by [facility] by registering at the following website: [email or web address]. By joining [employer]’s FollowMyHealth portal, you will also be able to view your health information using other applications (apps) compatible with our system.

## 2022-05-15 NOTE — ED PROVIDER NOTE - NSFOLLOWUPINSTRUCTIONS_ED_ALL_ED_FT
Take Ibuprofen 800mg every 8 hours as needed for pain, take with food, and in addition you may take Tylenol 1000 mg every 8 hours as needed for pain    A rib fracture is a break or crack in one of the bones of the ribs. The ribs are a group of long, curved bones that wrap around your chest and attach to your spine. A broken or cracked rib is often painful, but most do not cause other problems and heal on their own over time. Symptoms include pain when you breath or cough or pain when pressing over the area. Breathing exercises will help keep your lungs inflated and prevent lung collapse or infection.    SEEK IMMEDIATE MEDICAL CARE IF YOU HAVE ANY OF THE FOLLOWING SYMPTOMS: fever, shortness of breath, coughing up blood, abdominal pain, nausea or vomiting, pain not controlled with medications.     Follow up with your primary care doctor or clinics listed below if you do not have a doctor  [address] [address]  To make an appointment, call [phone]  [facility]  Address: [address] [place] [address]  Appointment Center: [phone] ([phone])

## 2022-05-15 NOTE — ED PROVIDER NOTE - CLINICAL SUMMARY MEDICAL DECISION MAKING FREE TEXT BOX
43 yo male s/p fall in setting of alcohol ingestion 5 days ago c/o left flank pain with bruising, found to have left 10th rib fracture, no pneumothorax, pain controlled with otc pain meds, patient looks well, advised supportive care, f/u pmd.    incidental findings on CT discussed with patient, advised f/u pmd
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
## 2022-05-17 DIAGNOSIS — R10.9 UNSPECIFIED ABDOMINAL PAIN: ICD-10-CM
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
## 2022-05-17 DIAGNOSIS — S22.32XA FRACTURE OF ONE RIB, LEFT SIDE, INITIAL ENCOUNTER FOR CLOSED FRACTURE: ICD-10-CM

## 2022-07-16 NOTE — ED ADULT TRIAGE NOTE - MODE OF ARRIVAL

## 2023-04-12 ENCOUNTER — APPOINTMENT (OUTPATIENT)
Dept: OPHTHALMOLOGY | Facility: CLINIC | Age: 43
End: 2023-04-12

## 2024-02-12 ENCOUNTER — APPOINTMENT (EMERGENCY)
Dept: RADIOLOGY | Facility: HOSPITAL | Age: 44
End: 2024-02-12
Payer: COMMERCIAL

## 2024-02-12 ENCOUNTER — HOSPITAL ENCOUNTER (EMERGENCY)
Facility: HOSPITAL | Age: 44
Discharge: HOME | End: 2024-02-12
Attending: STUDENT IN AN ORGANIZED HEALTH CARE EDUCATION/TRAINING PROGRAM | Admitting: STUDENT IN AN ORGANIZED HEALTH CARE EDUCATION/TRAINING PROGRAM
Payer: COMMERCIAL

## 2024-02-12 VITALS
WEIGHT: 180 LBS | BODY MASS INDEX: 24.38 KG/M2 | HEART RATE: 64 BPM | HEIGHT: 72 IN | DIASTOLIC BLOOD PRESSURE: 91 MMHG | TEMPERATURE: 98.2 F | RESPIRATION RATE: 20 BRPM | OXYGEN SATURATION: 95 % | SYSTOLIC BLOOD PRESSURE: 151 MMHG

## 2024-02-12 DIAGNOSIS — R51.9 ACUTE INTRACTABLE HEADACHE, UNSPECIFIED HEADACHE TYPE: Primary | ICD-10-CM

## 2024-02-12 LAB
ALBUMIN SERPL-MCNC: 4.2 G/DL (ref 3.5–5.7)
ALP SERPL-CCNC: 52 IU/L (ref 34–125)
ALT SERPL-CCNC: 38 IU/L (ref 7–52)
ANION GAP SERPL CALC-SCNC: 7 MEQ/L (ref 3–15)
AST SERPL-CCNC: 38 IU/L (ref 13–39)
ATRIAL RATE: 59
BASOPHILS # BLD: 0.03 K/UL (ref 0.01–0.1)
BASOPHILS NFR BLD: 0.6 %
BILIRUB SERPL-MCNC: 0.7 MG/DL (ref 0.3–1.2)
BUN SERPL-MCNC: 10 MG/DL (ref 7–25)
CALCIUM SERPL-MCNC: 9.2 MG/DL (ref 8.6–10.3)
CHLORIDE SERPL-SCNC: 101 MEQ/L (ref 98–107)
CO2 SERPL-SCNC: 28 MEQ/L (ref 21–31)
CREAT SERPL-MCNC: 0.7 MG/DL (ref 0.7–1.3)
DIFFERENTIAL METHOD BLD: ABNORMAL
EGFRCR SERPLBLD CKD-EPI 2021: >60 ML/MIN/1.73M*2
EOSINOPHIL # BLD: 0.08 K/UL (ref 0.04–0.54)
EOSINOPHIL NFR BLD: 1.6 %
ERYTHROCYTE [DISTWIDTH] IN BLOOD BY AUTOMATED COUNT: 12.1 % (ref 11.6–14.4)
GLUCOSE SERPL-MCNC: 97 MG/DL (ref 70–99)
HCT VFR BLD AUTO: 39.2 % (ref 40.1–51)
HGB BLD-MCNC: 13.5 G/DL (ref 13.7–17.5)
IMM GRANULOCYTES # BLD AUTO: 0.01 K/UL (ref 0–0.08)
IMM GRANULOCYTES NFR BLD AUTO: 0.2 %
LYMPHOCYTES # BLD: 1.2 K/UL (ref 1.2–3.5)
LYMPHOCYTES NFR BLD: 23.7 %
MCH RBC QN AUTO: 33.2 PG (ref 28–33.2)
MCHC RBC AUTO-ENTMCNC: 34.4 G/DL (ref 32.2–36.5)
MCV RBC AUTO: 96.3 FL (ref 83–98)
MONOCYTES # BLD: 0.4 K/UL (ref 0.3–1)
MONOCYTES NFR BLD: 7.9 %
NEUTROPHILS # BLD: 3.34 K/UL (ref 1.7–7)
NEUTS SEG NFR BLD: 66 %
NRBC BLD-RTO: 0 %
P AXIS: 50
PDW BLD AUTO: 8.9 FL (ref 9.4–12.4)
PLATELET # BLD AUTO: 206 K/UL (ref 150–350)
POTASSIUM SERPL-SCNC: 3.5 MEQ/L (ref 3.5–5.1)
PR INTERVAL: 160
PROT SERPL-MCNC: 6.9 G/DL (ref 6–8.2)
QRS DURATION: 86
QT INTERVAL: 428
QTC CALCULATION(BAZETT): 423
R AXIS: -32
RBC # BLD AUTO: 4.07 M/UL (ref 4.5–5.8)
SODIUM SERPL-SCNC: 136 MEQ/L (ref 136–145)
T WAVE AXIS: 5
TROPONIN I SERPL HS-MCNC: 3.1 PG/ML
VENTRICULAR RATE: 59
WBC # BLD AUTO: 5.06 K/UL (ref 3.8–10.5)

## 2024-02-12 PROCEDURE — 3E033GC INTRODUCTION OF OTHER THERAPEUTIC SUBSTANCE INTO PERIPHERAL VEIN, PERCUTANEOUS APPROACH: ICD-10-PCS | Performed by: STUDENT IN AN ORGANIZED HEALTH CARE EDUCATION/TRAINING PROGRAM

## 2024-02-12 PROCEDURE — 80053 COMPREHEN METABOLIC PANEL: CPT

## 2024-02-12 PROCEDURE — 96374 THER/PROPH/DIAG INJ IV PUSH: CPT

## 2024-02-12 PROCEDURE — 70450 CT HEAD/BRAIN W/O DYE: CPT | Mod: ME

## 2024-02-12 PROCEDURE — 3E0337Z INTRODUCTION OF ELECTROLYTIC AND WATER BALANCE SUBSTANCE INTO PERIPHERAL VEIN, PERCUTANEOUS APPROACH: ICD-10-PCS | Performed by: STUDENT IN AN ORGANIZED HEALTH CARE EDUCATION/TRAINING PROGRAM

## 2024-02-12 PROCEDURE — 63600000 HC DRUGS/DETAIL CODE: Mod: JZ

## 2024-02-12 PROCEDURE — 99284 EMERGENCY DEPT VISIT MOD MDM: CPT | Mod: 25

## 2024-02-12 PROCEDURE — 84484 ASSAY OF TROPONIN QUANT: CPT

## 2024-02-12 PROCEDURE — 93010 ELECTROCARDIOGRAM REPORT: CPT | Performed by: INTERNAL MEDICINE

## 2024-02-12 PROCEDURE — 93005 ELECTROCARDIOGRAM TRACING: CPT

## 2024-02-12 PROCEDURE — 25800000 HC PHARMACY IV SOLUTIONS

## 2024-02-12 PROCEDURE — 36415 COLL VENOUS BLD VENIPUNCTURE: CPT

## 2024-02-12 PROCEDURE — 96375 TX/PRO/DX INJ NEW DRUG ADDON: CPT

## 2024-02-12 PROCEDURE — 85025 COMPLETE CBC W/AUTO DIFF WBC: CPT

## 2024-02-12 RX ORDER — LORAZEPAM 2 MG/ML
0.5 INJECTION INTRAMUSCULAR ONCE
Status: COMPLETED | OUTPATIENT
Start: 2024-02-12 | End: 2024-02-12

## 2024-02-12 RX ORDER — METOCLOPRAMIDE HYDROCHLORIDE 5 MG/ML
10 INJECTION INTRAMUSCULAR; INTRAVENOUS ONCE
Status: COMPLETED | OUTPATIENT
Start: 2024-02-12 | End: 2024-02-12

## 2024-02-12 RX ORDER — DIPHENHYDRAMINE HCL 50 MG/ML
25 VIAL (ML) INJECTION ONCE
Status: COMPLETED | OUTPATIENT
Start: 2024-02-12 | End: 2024-02-12

## 2024-02-12 RX ADMIN — METOCLOPRAMIDE 10 MG: 5 INJECTION, SOLUTION INTRAMUSCULAR; INTRAVENOUS at 01:12

## 2024-02-12 RX ADMIN — SODIUM CHLORIDE 1000 ML: 9 INJECTION, SOLUTION INTRAVENOUS at 01:12

## 2024-02-12 RX ADMIN — LORAZEPAM 0.5 MG: 2 INJECTION INTRAMUSCULAR; INTRAVENOUS at 01:12

## 2024-02-12 RX ADMIN — DIPHENHYDRAMINE HYDROCHLORIDE 25 MG: 50 INJECTION INTRAMUSCULAR; INTRAVENOUS at 01:12

## 2024-02-12 ASSESSMENT — ENCOUNTER SYMPTOMS
DIZZINESS: 0
NECK PAIN: 0
SHORTNESS OF BREATH: 0
DIARRHEA: 0
BACK PAIN: 0
NUMBNESS: 0
FEVER: 0
ABDOMINAL PAIN: 0
EYE PAIN: 0
WEAKNESS: 0
PHOTOPHOBIA: 0
VOMITING: 0
SORE THROAT: 0
TROUBLE SWALLOWING: 0
HEMATURIA: 0
NAUSEA: 0
NECK STIFFNESS: 0
CHEST TIGHTNESS: 0
DYSURIA: 0
CHILLS: 0
CONSTIPATION: 0
HEADACHES: 1
COUGH: 0

## 2024-02-12 NOTE — ED PROVIDER NOTES
Emergency Medicine Note  HPI   HISTORY OF PRESENT ILLNESS   Patient is a 44-year-old male with a past medical history of hyperlipidemia, mild CAD, 1 episode of A-fib in 2020,who comes to the ED presenting headache and elevated blood pressure.  Patient reports he started on Friday presenting brain fogginess, it was self-limited, accompanied on Saturday of around 5 PM of a headache, bandlike, not medicated, with no improvement, 4/10 in intensity.  Patient was taking his blood pressure at home multiple times and the numbers kept rising and reached 180-190/100s for which patient called the EMS and he was brought to the ED.  Patient reports at the end of January he was snowboarding in [place] and received a head strike, without LOC, he was seeing floaters after the event, he was examined at the time, they improved.  He does not present visual disturbances at the moment.  Patient denies problems swallowing, nausea, vomiting, fevers, chills, syncope, chest pain, shortness of breath, back pain, abdominal pain, dysuria, hematuria, numbness or tingling, weakness.      HPI      Patient History   PAST HISTORY     Reviewed from Nursing Triage:  Allergies       No past medical history on file.    No past surgical history on file.    No family history on file.    Social History     Tobacco Use   • Smoking status: Never   • Smokeless tobacco: Never   Substance Use Topics   • Alcohol use: Yes   • Drug use: Never         Review of Systems   REVIEW OF SYSTEMS     Review of Systems   Constitutional: Negative for chills and fever.   HENT: Negative for sore throat and trouble swallowing.    Eyes: Negative for photophobia and pain.   Respiratory: Negative for cough, chest tightness and shortness of breath.    Gastrointestinal: Negative for abdominal pain, constipation, diarrhea, nausea and vomiting.   Genitourinary: Negative for dysuria and hematuria.   Musculoskeletal: Negative for back pain, neck pain and neck stiffness.    Neurological: Positive for headaches. Negative for dizziness, syncope, weakness and numbness.         VITALS     ED Vitals    Date/Time Temp Pulse Resp BP SpO2 [facility]   02/12/24 0200 -- 64 20 151/91 95 % NMN   02/12/24 0051 36.8 °C (98.2 °F) 63 18 164/104 96 % MFM        Pulse Ox %: 96 % (02/12/24 0051)  Pulse Ox Interpretation: Normal (02/12/24 0051)           Physical Exam   PHYSICAL EXAM     Physical Exam  Constitutional:       Appearance: Normal appearance.   Eyes:      Extraocular Movements: Extraocular movements intact.      Conjunctiva/sclera: Conjunctivae normal.      Pupils: Pupils are equal, round, and reactive to light.   Cardiovascular:      Rate and Rhythm: Normal rate and regular rhythm.      Pulses: Normal pulses.      Heart sounds: Normal heart sounds. No murmur heard.     No friction rub. No gallop.   Pulmonary:      Effort: Pulmonary effort is normal.      Breath sounds: Normal breath sounds. No wheezing, rhonchi or rales.   Abdominal:      General: Bowel sounds are normal.      Tenderness: There is no abdominal tenderness. There is no guarding.   Musculoskeletal:         General: Normal range of motion.      Right lower leg: No edema.      Left lower leg: No edema.   Neurological:      General: No focal deficit present.      Mental Status: He is alert and oriented to person, place, and time.      Cranial Nerves: No cranial nerve deficit.      Sensory: No sensory deficit.      Motor: No weakness.           PROCEDURES     Procedures     DATA     Results     Procedure Component Value Units Date/Time    HS Troponin I (with 2 hour reflex) [[id number]]  (Normal) Collected: 02/12/24 0110    Specimen: Blood, Venous Updated: 02/12/24 0149     High Sens Troponin I 3.1 pg/mL     Comprehensive metabolic panel [[id number]]  (Normal) Collected: 02/12/24 0110    Specimen: Blood, Venous Updated: 02/12/24 0138     Sodium 136 mEQ/L      Potassium 3.5 mEQ/L      Comment: Results obtained on plasma. Plasma Potassium  values may be up to 0.4 mEQ/L less than serum values. The differences may be greater for patients with high platelet or white cell counts.        Chloride 101 mEQ/L      CO2 28 mEQ/L      BUN 10 mg/dL      Creatinine 0.7 mg/dL      Glucose 97 mg/dL      Calcium 9.2 mg/dL      AST (SGOT) 38 IU/L      ALT (SGPT) 38 IU/L      Alkaline Phosphatase 52 IU/L      Total Protein 6.9 g/dL      Comment: Test performed on plasma which typically contains approximately 0.4 g/dL more protein than serum.        Albumin 4.2 g/dL      Bilirubin, Total 0.7 mg/dL      eGFR >60.0 mL/min/1.73m*2      Comment: Calculation based on the Chronic Kidney Disease Epidemiology Collaboration (CKD-EPI) equation refit without adjustment for race.        Anion Gap 7 mEQ/L     CBC and differential [[id number]]  (Abnormal) Collected: 02/12/24 0110    Specimen: Blood, Venous Updated: 02/12/24 0123     WBC 5.06 K/uL      RBC 4.07 M/uL      Hemoglobin 13.5 g/dL      Hematocrit 39.2 %      MCV 96.3 fL      MCH 33.2 pg      MCHC 34.4 g/dL      RDW 12.1 %      Platelets 206 K/uL      MPV 8.9 fL      Differential Type Auto     nRBC 0.0 %      Immature Granulocytes 0.2 %      Neutrophils 66.0 %      Lymphocytes 23.7 %      Monocytes 7.9 %      Eosinophils 1.6 %      Basophils 0.6 %      Immature Granulocytes, Absolute 0.01 K/uL      Neutrophils, Absolute 3.34 K/uL      Lymphocytes, Absolute 1.20 K/uL      Monocytes, Absolute 0.40 K/uL      Eosinophils, Absolute 0.08 K/uL      Basophils, Absolute 0.03 K/uL           Imaging Results          CT HEAD WITHOUT IV CONTRAST (Preliminary result)  Result time 02/12/24 03:08:35    Preliminary Interpretation    FINDINGS:  c/w: No prior.    Brain parenchyma unremarkable.  No recent cortical infarct.  No mass effect, midline shift, or hydrocephalus.  No acute hemorrhage.    No calvarial fracture.    IMPRESSION:  1. No acute intracranial abnormality.                                  EKG 12 lead          Scoring tools                                   ED Course & MDM   MDM / ED COURSE / CLINICAL IMPRESSION / DISPO     MDM    ED Course as of 02/12/24 0311   Mon Feb 12, 2024   0140 Comprehensive metabolic panel  Within normal values [CK]   0141 CBC and differential(!)  Within acceptable values [CK]   0221 High Sens Troponin I: 3.1  negative [CK]   0221 Patient reevaluated and blood pressure at the moment has improved to 157/91 mmHg, patient also presents improvement of his headache. [CK]   0310 CT HEAD WITHOUT IV CONTRAST  FINDINGS:  c/w: No prior.    Brain parenchyma unremarkable.  No recent cortical infarct.  No mass effect, midline shift, or hydrocephalus.  No acute hemorrhage.    No calvarial fracture.    IMPRESSION:  1. No acute intracranial abnormality.    [CK]   0310 Patient is now stable for discharge.  Patient recommended follow-up with primary care provider for reevaluation and continued care.  Return precautions were given. [CK]      ED Course User Index  [CK] Brook Nobles PA C     Clinical Impression      Acute intractable headache, unspecified headache type               Brook Nobles PA C  02/12/24 0226       Brook Nobles PA C  02/12/24 0311

## 2024-02-12 NOTE — ED ATTESTATION NOTE
I personally saw and evaluated the patient, participated in the management, and agree with the findings in the note above except as where stated. The physician assistant and I discussed the case, workup, and disposition.     44-year-old male with past medical history significant for hyperlipidemia presenting for evaluation of headache, elevated blood pressure.  Patient reports on Friday he had an episode of brain fog that resolved.  He reports that Sunday around 5 PM he developed a tension-like frontal headache that is 4 out of 10 constant without exacerbating relieving factors.  He kept checking his blood pressure and noted that it was rising.  He saw that it was 182/101 at its highest.  He started googling hypertensive emergency and was concerned prompting ED visit.  He also reports at the end of January he was skiing in [place] and hit his head without loss of consciousness and was seen in the ER without a head CT.  He denies any visual disturbances, weakness or numbness.  Denies chest pain, shortness of breath, nausea, vomiting, abdominal pain.  He does report that he is been seen by cardiology in the past and was given metoprolol for anxiety; however, rarely takes it.  He does report feeling anxious at this time.  Denies family history of brain aneurysms or tumors.    AAOx3 resting comfortably in no acute distress  Heart is regular rate and rhythm normal S1-S2 no murmurs rubs or gallops  Lungs are clear to auscultation bilaterally  Abdomen nondistended, soft, nontender in all quadrants  Pupils are equal round reactive to light, extraocular muscles are intact, no facial asymmetry, no tongue or uvular deviation  5 out of 5 strength to the upper and lower extremities, sensation equal intact bilaterally  Finger-to-nose within normal limits    44-year-old male presenting for evaluation of headache and hypertension.  Labs reassuring.  CT head pending.  Patient with spontaneous improvement of blood pressure after  treatment for headache.     Lyndon Joshua MD  02/12/24 0219

## 2024-02-12 NOTE — DISCHARGE INSTRUCTIONS
Please return to the ED for any increased headache, dizziness, changes in vision, chest pain, trouble breathing, nausea, sweating, vomiting, cough, fevers, weakness or numbness,  any worsening of symptoms or any other problems or concerns.     You are recommended to follow-up with your primary care provider for reevaluation and continued care.  Please call to make an appointment to follow up with your healthcare provider .    Thank you for letting us be part of your care

## 2024-08-12 PROBLEM — Z00.00 ENCOUNTER FOR PREVENTIVE HEALTH EXAMINATION: Status: ACTIVE | Noted: 2024-08-12

## 2025-06-06 ENCOUNTER — EMERGENCY (EMERGENCY)
Facility: HOSPITAL | Age: 45
LOS: 1 days | End: 2025-06-06
Attending: EMERGENCY MEDICINE | Admitting: EMERGENCY MEDICINE
Payer: COMMERCIAL

## 2025-06-06 VITALS
DIASTOLIC BLOOD PRESSURE: 87 MMHG | OXYGEN SATURATION: 96 % | TEMPERATURE: 99 F | RESPIRATION RATE: 16 BRPM | SYSTOLIC BLOOD PRESSURE: 137 MMHG | HEART RATE: 76 BPM | WEIGHT: 179.9 LBS

## 2025-06-06 VITALS
DIASTOLIC BLOOD PRESSURE: 95 MMHG | RESPIRATION RATE: 16 BRPM | HEART RATE: 82 BPM | SYSTOLIC BLOOD PRESSURE: 152 MMHG | TEMPERATURE: 99 F | OXYGEN SATURATION: 96 %

## 2025-06-06 PROCEDURE — 73130 X-RAY EXAM OF HAND: CPT | Mod: 26,LT

## 2025-06-06 PROCEDURE — 26725 TREAT FINGER FRACTURE EACH: CPT | Mod: 54,F3

## 2025-06-06 PROCEDURE — 99284 EMERGENCY DEPT VISIT MOD MDM: CPT | Mod: 57

## 2025-06-06 NOTE — ED PROVIDER NOTE - PATIENT PORTAL LINK FT
You can access the FollowMyHealth Patient Portal offered by [facility] by registering at the following website: [email or web address]. By joining [employer]’s FollowMyHealth portal, you will also be able to view your health information using other applications (apps) compatible with our system.

## 2025-06-06 NOTE — ED PROVIDER NOTE - PHYSICAL EXAMINATION
VITAL SIGNS: I have reviewed nursing notes and confirm.  CONSTITUTIONAL: Well-developed; well-nourished; in no acute distress.  HEAD: Normocephalic; atraumatic.  EYES: EOM intact; conjunctiva and sclera clear.  ENT: nose appears normal  NECK: Supple  RESP: breathing comfortably on RA  EXT: Left hand with obvious deformity of the left ring finger at the MP joint with significant ecchymosis and swelling.  No significant pain with active range of motion.  No distal sensory deficits  PSYCH: Cooperative, appropriate.

## 2025-06-06 NOTE — ED ADULT NURSE NOTE - OBJECTIVE STATEMENT
Pt presents to this ER w/ c/c of Finger injury. Pt states he fell yesterday . Pt now presents w/ 4 th digit deformity on L hand.

## 2025-06-06 NOTE — ED PROVIDER NOTE - NSFOLLOWUPINSTRUCTIONS_ED_ALL_ED_FT
Please keep the splint on at all times until you are seen by the hand specialist.  You may take Tylenol or Motrin as needed for pain.  The numbness in the finger will wear off in about 1 to 1-1/2 hours.

## 2025-06-06 NOTE — ED PROVIDER NOTE - OBJECTIVE STATEMENT
Patient presents to the Emergency Room with a suspected dislocation of the left ring finger. The injury occurred around 2:00 AM this morning when the patient fell. Initially, the patient thought it was just a jammed finger, as they have experienced this before. However, upon noticing that the finger looked different, they decided to seek medical attention. The patient is right-handed and reports being able to move the affected finger despite the injury. No other associated symptoms or denied symptoms were provided. The patient did not seek immediate medical attention, choosing to ice the finger at home before coming to the ER.

## 2025-06-06 NOTE — ED ADULT TRIAGE NOTE - CHIEF COMPLAINT QUOTE
pt reports left 4th finger pain/injury sustained after fall this morning; noted swelling and deformity on affected area; takes metoprolol, Crestor

## 2025-06-06 NOTE — ED ADULT NURSE NOTE - NSFALLRISKASMTTYPE_ED_ALL_ED
Continue Zyrtec 10 mg p.o. daily, and start daily use of Flonase daily as prescribed.   Initial (On Arrival)

## 2025-06-06 NOTE — ED ADULT NURSE NOTE - NSFALLRISKINTERV_ED_ALL_ED

## 2025-06-06 NOTE — ED PROVIDER NOTE - CARE PROVIDER_API CALL
Baldemar Lee  Plastic Surgery  [address] [place] [id number]  Phone: [phone]  Fax: [phone]  Follow Up Time: 4-6 Days

## 2025-06-06 NOTE — ED PROVIDER NOTE - CLINICAL SUMMARY MEDICAL DECISION MAKING FREE TEXT BOX
Patient presents with a suspected dislocation of the left ring finger at the MP joint. Physical examination reveals a visible deformity at the MP joint. The patient retains some movement in the finger and has intact sensation. Differential diagnoses include:    1. MP joint dislocation  2. Fracture of the proximal phalanx or metacarpal  3. Ligament injury    Plan:  1. X-ray of the left ring finger to evaluate for dislocation or fracture  2. If X-ray confirms dislocation without fracture, proceed with closed reduction  3. If fracture is present, orthopedic consultation may be necessary  4. Post-reduction care instructions to be provided based on findings  5. Follow-up appointment to be scheduled within 1-2 weeks for reassessment

## 2025-06-09 DIAGNOSIS — M79.645 PAIN IN LEFT FINGER(S): ICD-10-CM
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
## 2025-06-09 DIAGNOSIS — S62.615A DISPLACED FRACTURE OF PROXIMAL PHALANX OF LEFT RING FINGER, INITIAL ENCOUNTER FOR CLOSED FRACTURE: ICD-10-CM

## 2025-06-10 ENCOUNTER — EMERGENCY (EMERGENCY)
Facility: HOSPITAL | Age: 45
LOS: 1 days | End: 2025-06-10
Admitting: EMERGENCY MEDICINE
Payer: COMMERCIAL

## 2025-06-10 VITALS
OXYGEN SATURATION: 98 % | TEMPERATURE: 98 F | SYSTOLIC BLOOD PRESSURE: 172 MMHG | WEIGHT: 179.9 LBS | RESPIRATION RATE: 16 BRPM | HEIGHT: 72 IN | HEART RATE: 91 BPM | DIASTOLIC BLOOD PRESSURE: 112 MMHG

## 2025-06-10 DIAGNOSIS — S62.92XD UNSPECIFIED FRACTURE OF LEFT WRIST AND HAND, SUBSEQUENT ENCOUNTER FOR FRACTURE WITH ROUTINE HEALING: ICD-10-CM

## 2025-06-10 DIAGNOSIS — S60.522D: ICD-10-CM

## 2025-06-10 PROCEDURE — 99283 EMERGENCY DEPT VISIT LOW MDM: CPT | Mod: 25

## 2025-06-10 RX ORDER — GABAPENTIN 400 MG/1
1 CAPSULE ORAL
Qty: 14 | Refills: 0
Start: 2025-06-10 | End: 2025-06-23

## 2025-06-10 NOTE — ED PROVIDER NOTE - OBJECTIVE STATEMENT
44 yo m here with L hand fracture that he sustained 3 d ago with swelling and ecchymosis with 1 isolated blister requesting wound check. No fevers, erythema, or ttp along the hand. TTP is isolated to the fx site.    I have reviewed available current nursing and previous documentation of past medical, surgical, family, and/or social history.

## 2025-06-10 NOTE — ED PROVIDER NOTE - NSFOLLOWUPINSTRUCTIONS_ED_ALL_ED_FT
Finger Fracture    WHAT YOU NEED TO KNOW:    A finger fracture is a break in 1 or more of the bones in your finger.     DISCHARGE INSTRUCTIONS:    Return to the emergency department if:   Your cast or splint gets wet, damaged, or comes off.  Your splint or cast feels too tight.  You have severe pain.  Your injured finger is numb, cold, or pale.    Contact your healthcare provider or hand specialist if:   Your pain or swelling gets worse, even after treatment.  You have questions or concerns about your condition or care.    Medicines:     NSAIDs, such as ibuprofen, help decrease swelling, pain, and fever. This medicine is available with or without a doctor's order. NSAIDs can cause stomach bleeding or kidney problems in certain people. If you take blood thinner medicine, always ask your healthcare provider if NSAIDs are safe for you. Always read the medicine label and follow directions.    Acetaminophen decreases pain and fever. It is available without a doctor's order. Ask how much to take and how often to take it. Follow directions. Acetaminophen can cause liver damage if not taken correctly.    Prescription pain medicine may be given. Ask your healthcare provider how to take this medicine safely. Some prescription pain medicines contain acetaminophen. Do not take other medicines that contain acetaminophen without talking to your healthcare provider. Too much acetaminophen may cause liver damage. Prescription pain medicine may cause constipation. Ask your healthcare provider how to prevent or treat constipation.     Take your medicine as directed. Contact your healthcare provider if you think your medicine is not helping or if you have side effects. Tell him or her if you are allergic to any medicine. Keep a list of the medicines, vitamins, and herbs you take. Include the amounts, and when and why you take them. Bring the list or the pill bottles to follow-up visits. Carry your medicine list with you in case of an emergency.    Self-care:   Wear your splint as directed. Do not remove your splint until you follow up with your healthcare provider or hand specialist.     Apply ice on your finger for 15 to 20 minutes every hour or as directed. Use an ice pack, or put crushed ice in a plastic bag. Cover it with a towel before you apply it to your skin. Ice helps prevent tissue damage and decreases swelling and pain.    Elevate your finger above the level of your heart as often as you can. This will help decrease swelling and pain. Prop your hand on pillows or blankets to keep it elevated comfortably.     Go to physical therapy as directed. A physical therapist teaches you exercises to help improve movement and strength, and to decrease pain.     Follow up with your healthcare provider or hand specialist within 2 days: Write down your questions so you remember to ask them during your visits.        © Copyright [employer] 2019 All illustrations and images included in CareNotes are the copyrighted property of [employer] or [employer].

## 2025-06-10 NOTE — ED PROVIDER NOTE - CLINICAL SUMMARY MEDICAL DECISION MAKING FREE TEXT BOX
46 yo m here with L hand fracture that he sustained 3 d ago with swelling and ecchymosis with 1 isolated blister requesting wound check. No fevers, erythema, or ttp along the hand. TTP is isolated to the fx site. On exam well appearing hand with soft compartments and neurovascular intact, pt was offered an ulnar gutter splint but refused, given ace wrap to help stabilize the finger splint, he has appointment with his hand surgeon later this week. At discharge pt requesting gabapentin to help him with insomnia.

## 2025-06-10 NOTE — ED ADULT NURSE NOTE - NS PRO PASSIVE SMOKE EXP

## 2025-06-10 NOTE — ED PROVIDER NOTE - PATIENT PORTAL LINK FT
You can access the FollowMyHealth Patient Portal offered by [facility] by registering at the following website: [email or web address]. By joining [employer]’s FollowMyHealth portal, you will also be able to view your health information using other applications (apps) compatible with our system.

## 2025-06-10 NOTE — ED ADULT TRIAGE NOTE - STATUS:
- Collected GBS  - Cervix checked per pt request. Closed. Discussed s/s of labor, when to present to L&D.  - To schedule 39wk IOL at next visit   Applied

## 2025-06-10 NOTE — ED PROVIDER NOTE - PHYSICAL EXAMINATION
Physical Exam    Vital Signs: I have reviewed the initial vital signs.  Constitutional: well-appearing, appears stated age  Cardiovascular: regular rate, regular rhythm, well-perfused extremities, radial pulse +2 and equal b/l  Musculoskeletal: supple neck, no lower extremity edema, +L4th digit splint intact with one isolated fx blister L ulnar palmar side   Integumentary: warm, dry, no rash  Neurologic: UE extremities’ motor and sensory functions grossly intact b/l

## 2025-06-12 NOTE — ASU PATIENT PROFILE, ADULT - NSICDXPASTMEDICALHX_GEN_ALL_CORE_FT
PAST MEDICAL HISTORY:  Afib occassional - following with cardiologist    Hyperlipidemia     ESDRAS (obstructive sleep apnea) experiencing symptoms but no official diagnosis

## 2025-06-12 NOTE — ASU PATIENT PROFILE, ADULT - FALL HARM RISK - RISK INTERVENTIONS

## 2025-06-12 NOTE — ASU PATIENT PROFILE, ADULT - NS PREOP UNDERSTANDS INFO
NPO solids 9am DOS water till 1pm bring picture ID and insurance info escort required for discharge home

## 2025-06-12 NOTE — ASU PATIENT PROFILE, ADULT - NSICDXPASTSURGICALHX_GEN_ALL_CORE_FT
PAST SURGICAL HISTORY:  H/O squamous cell carcinoma excision RLE    History of colonoscopy x2    Status post medial meniscus repair

## 2025-06-13 ENCOUNTER — OUTPATIENT (OUTPATIENT)
Dept: OUTPATIENT SERVICES | Facility: HOSPITAL | Age: 45
LOS: 1 days | Discharge: HOME | End: 2025-06-13

## 2025-06-13 VITALS
HEART RATE: 72 BPM | RESPIRATION RATE: 16 BRPM | DIASTOLIC BLOOD PRESSURE: 75 MMHG | TEMPERATURE: 98 F | OXYGEN SATURATION: 100 % | SYSTOLIC BLOOD PRESSURE: 110 MMHG

## 2025-06-13 VITALS
SYSTOLIC BLOOD PRESSURE: 173 MMHG | HEART RATE: 71 BPM | HEIGHT: 72 IN | TEMPERATURE: 98 F | DIASTOLIC BLOOD PRESSURE: 104 MMHG | WEIGHT: 186.07 LBS | RESPIRATION RATE: 16 BRPM | OXYGEN SATURATION: 99 %

## 2025-06-13 DIAGNOSIS — Z98.890 OTHER SPECIFIED POSTPROCEDURAL STATES: Chronic | ICD-10-CM

## 2025-06-13 DEVICE — K-WIRE BRASSELER (SMOOTH) DOUBLE TROCAR 1.1MM X 4": Type: IMPLANTABLE DEVICE | Status: FUNCTIONAL

## 2025-06-13 RX ORDER — METOPROLOL SUCCINATE 50 MG/1
1 TABLET, EXTENDED RELEASE ORAL
Refills: 0 | DISCHARGE

## 2025-06-13 RX ORDER — ROSUVASTATIN CALCIUM 20 MG/1
1 TABLET, FILM COATED ORAL
Refills: 0 | DISCHARGE

## 2025-06-13 NOTE — ASU DISCHARGE PLAN (ADULT/PEDIATRIC) - NS MD DC FALL RISK RISK
For information on Fall & Injury Prevention, visit: [email or web address] OR  [email or web address] OR  https://www.cdc.gov/steadi/patient.html

## 2025-06-13 NOTE — ASU DISCHARGE PLAN (ADULT/PEDIATRIC) - ASU DC SPECIAL INSTRUCTIONSFT
Please follow Dr. Hull’s instruction sheets   ACTIVITY:   - Weight bear as tolerated with assistive device. No strenuous activity, heavy lifting, driving or returning to work until cleared by MD.   - Apply a cold compress to the surgical site several times daily to reduce pain and swelling. For icing, twenty-minute sessions followed by an hour off is recommended. You should ice as frequently as possible. Ice should NEVER be placed directly on the skin. Wearing compression stockings during the first week after surgery can help reduce swelling in your knee, calf and foot, but is not required.      DRESSING/SHOWERING:   - You may shower with a plastic or other waterproof covering over your dressing. Keep your surgical site clean and dry. Do not apply creams, ointments or oils to your incision until cleared by your surgeon. Do not soak your incision in sitting water (ie tubs, pools, lakes, etc.) until cleared by your surgeon. Do not scrub the incision – instead, allow soap and water to flow over the incision and then pat it dry with a clean towel.     MEDICATION/ANTICOAGULATION:   - You have been prescribed medications for pain:     - Tylenol for mild to moderate pain. Do not exceed 3,000mg daily.     - For more severe pain, take Tylenol with the addition of narcotic pain medication. Take this medication as prescribed. This medication may cause drowsiness or dizziness. Do not operate machinery. This medication may cause constipation.   - For any additional medications, follow instructions on the bottle.    -Try to have regular bowel movements. Take stool softener or laxative if necessary. You may wish to take Miralax daily until you have regular bowel movements.    - If you have been prescribed Aspirin or an anti-inflammatory, please take prilosec (omeprazole) once a day, before breakfast, until no longer taking Aspirin or anti-inflammatory. This will help protect your stomach.   - If you have a pain management physician, please follow-up with them postoperatively.    - If you experience any negative side effects of your medications, please call your surgeon's office to discuss.      FOLLOW-UP:   - Call to schedule an appt with Dr. Hull for follow up.   - Please follow-up with your primary care physician or any other specialist you see postoperatively, if needed.    - Contact your doctor or go to the emergency room if you experience: fever greater than 101.5, chills, chest pain, difficulty breathing, redness or excessive drainage around the incision, other concerns.

## 2025-06-13 NOTE — ASU DISCHARGE PLAN (ADULT/PEDIATRIC) - FINANCIAL ASSISTANCE
[facility] provides services at a reduced cost to those who are determined to be eligible through [facility]’s financial assistance program. Information regarding [facility]’s financial assistance program can be found by going to [email or web address] or by calling [phone].

## 2025-06-13 NOTE — ASU DISCHARGE PLAN (ADULT/PEDIATRIC) - CARE PROVIDER_API CALL
Melida Hull  Orthopaedic Surgery  [address] [place] [address] [address]  [place] [address]  Phone: [phone]  Fax: [phone]  Follow Up Time:

## 2025-06-13 NOTE — ASU DISCHARGE PLAN (ADULT/PEDIATRIC) - BATHING
No change Crescentic Advancement Flap Text: The defect edges were debeveled with a #15 scalpel blade.  Given the location of the defect and the proximity to free margins a crescentic advancement flap was deemed most appropriate.  Using a sterile surgical marker, the appropriate advancement flap was drawn incorporating the defect and placing the expected incisions within the relaxed skin tension lines where possible.    The area thus outlined was incised deep to adipose tissue with a #15 scalpel blade.  The skin margins were undermined to an appropriate distance in all directions utilizing iris scissors.

## 2025-07-31 ENCOUNTER — EMERGENCY (EMERGENCY)
Age: 45
LOS: 1 days | End: 2025-07-31
Attending: STUDENT IN AN ORGANIZED HEALTH CARE EDUCATION/TRAINING PROGRAM | Admitting: STUDENT IN AN ORGANIZED HEALTH CARE EDUCATION/TRAINING PROGRAM
Payer: COMMERCIAL

## 2025-07-31 VITALS
OXYGEN SATURATION: 98 % | TEMPERATURE: 98 F | WEIGHT: 184.97 LBS | HEART RATE: 77 BPM | RESPIRATION RATE: 17 BRPM | DIASTOLIC BLOOD PRESSURE: 111 MMHG | HEIGHT: 72 IN | SYSTOLIC BLOOD PRESSURE: 181 MMHG

## 2025-07-31 VITALS
DIASTOLIC BLOOD PRESSURE: 76 MMHG | SYSTOLIC BLOOD PRESSURE: 161 MMHG | OXYGEN SATURATION: 98 % | RESPIRATION RATE: 16 BRPM | TEMPERATURE: 97 F | HEART RATE: 65 BPM

## 2025-07-31 DIAGNOSIS — Z98.890 OTHER SPECIFIED POSTPROCEDURAL STATES: Chronic | ICD-10-CM

## 2025-07-31 DIAGNOSIS — F10.939 ALCOHOL USE, UNSPECIFIED WITH WITHDRAWAL, UNSPECIFIED: ICD-10-CM
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
## 2025-07-31 DIAGNOSIS — F41.9 ANXIETY DISORDER, UNSPECIFIED: ICD-10-CM

## 2025-07-31 PROBLEM — E78.5 HYPERLIPIDEMIA, UNSPECIFIED: Chronic | Status: ACTIVE | Noted: 2025-06-13

## 2025-07-31 PROBLEM — I48.91 UNSPECIFIED ATRIAL FIBRILLATION: Chronic | Status: ACTIVE | Noted: 2025-06-13

## 2025-07-31 PROBLEM — G47.33 OBSTRUCTIVE SLEEP APNEA (ADULT) (PEDIATRIC): Chronic | Status: ACTIVE | Noted: 2025-06-13

## 2025-07-31 LAB
ALBUMIN SERPL ELPH-MCNC: 4 G/DL — SIGNIFICANT CHANGE UP (ref 3.4–5)
ALP SERPL-CCNC: 65 U/L — SIGNIFICANT CHANGE UP (ref 40–120)
ALT FLD-CCNC: 67 U/L — HIGH (ref 12–42)
ANION GAP SERPL CALC-SCNC: 8 MMOL/L — LOW (ref 9–16)
AST SERPL-CCNC: 66 U/L — HIGH (ref 15–37)
BASOPHILS # BLD AUTO: 0.04 K/UL — SIGNIFICANT CHANGE UP (ref 0–0.2)
BASOPHILS NFR BLD AUTO: 0.8 % — SIGNIFICANT CHANGE UP (ref 0–2)
BILIRUB SERPL-MCNC: 1 MG/DL — SIGNIFICANT CHANGE UP (ref 0.2–1.2)
BUN SERPL-MCNC: 7 MG/DL — SIGNIFICANT CHANGE UP (ref 7–23)
CALCIUM SERPL-MCNC: 9.2 MG/DL — SIGNIFICANT CHANGE UP (ref 8.5–10.5)
CHLORIDE SERPL-SCNC: 101 MMOL/L — SIGNIFICANT CHANGE UP (ref 96–108)
CO2 SERPL-SCNC: 29 MMOL/L — SIGNIFICANT CHANGE UP (ref 22–31)
CREAT SERPL-MCNC: 0.58 MG/DL — SIGNIFICANT CHANGE UP (ref 0.5–1.3)
EGFR: 121 ML/MIN/1.73M2 — SIGNIFICANT CHANGE UP
EGFR: 121 ML/MIN/1.73M2 — SIGNIFICANT CHANGE UP
EOSINOPHIL # BLD AUTO: 0.1 K/UL — SIGNIFICANT CHANGE UP (ref 0–0.5)
EOSINOPHIL NFR BLD AUTO: 2.1 % — SIGNIFICANT CHANGE UP (ref 0–6)
ETHANOL SERPL-MCNC: <3 MG/DL — SIGNIFICANT CHANGE UP
GLUCOSE SERPL-MCNC: 99 MG/DL — SIGNIFICANT CHANGE UP (ref 70–99)
HCT VFR BLD CALC: 39.3 % — SIGNIFICANT CHANGE UP (ref 39–50)
HGB BLD-MCNC: 13.5 G/DL — SIGNIFICANT CHANGE UP (ref 13–17)
IMM GRANULOCYTES # BLD AUTO: 0.01 K/UL — SIGNIFICANT CHANGE UP (ref 0–0.07)
IMM GRANULOCYTES NFR BLD AUTO: 0.2 % — SIGNIFICANT CHANGE UP (ref 0–0.9)
LYMPHOCYTES # BLD AUTO: 1.27 K/UL — SIGNIFICANT CHANGE UP (ref 1–3.3)
LYMPHOCYTES NFR BLD AUTO: 26.5 % — SIGNIFICANT CHANGE UP (ref 13–44)
MAGNESIUM SERPL-MCNC: 2.1 MG/DL — SIGNIFICANT CHANGE UP (ref 1.6–2.6)
MCHC RBC-ENTMCNC: 33.3 PG — SIGNIFICANT CHANGE UP (ref 27–34)
MCHC RBC-ENTMCNC: 34.4 G/DL — SIGNIFICANT CHANGE UP (ref 32–36)
MCV RBC AUTO: 97 FL — SIGNIFICANT CHANGE UP (ref 80–100)
MONOCYTES # BLD AUTO: 0.64 K/UL — SIGNIFICANT CHANGE UP (ref 0–0.9)
MONOCYTES NFR BLD AUTO: 13.3 % — SIGNIFICANT CHANGE UP (ref 2–14)
NEUTROPHILS # BLD AUTO: 2.74 K/UL — SIGNIFICANT CHANGE UP (ref 1.8–7.4)
NEUTROPHILS NFR BLD AUTO: 57.1 % — SIGNIFICANT CHANGE UP (ref 43–77)
NRBC # BLD AUTO: 0 K/UL — SIGNIFICANT CHANGE UP (ref 0–0)
NRBC # FLD: 0 K/UL — SIGNIFICANT CHANGE UP (ref 0–0)
NRBC BLD AUTO-RTO: 0 /100 WBCS — SIGNIFICANT CHANGE UP (ref 0–0)
PLATELET # BLD AUTO: 179 K/UL — SIGNIFICANT CHANGE UP (ref 150–400)
PMV BLD: 9.2 FL — SIGNIFICANT CHANGE UP (ref 7–13)
POTASSIUM SERPL-MCNC: 3.5 MMOL/L — SIGNIFICANT CHANGE UP (ref 3.5–5.3)
POTASSIUM SERPL-SCNC: 3.5 MMOL/L — SIGNIFICANT CHANGE UP (ref 3.5–5.3)
PROT SERPL-MCNC: 7.9 G/DL — SIGNIFICANT CHANGE UP (ref 6.4–8.2)
RBC # BLD: 4.05 M/UL — LOW (ref 4.2–5.8)
RBC # FLD: 11.8 % — SIGNIFICANT CHANGE UP (ref 10.3–14.5)
SODIUM SERPL-SCNC: 138 MMOL/L — SIGNIFICANT CHANGE UP (ref 132–145)
WBC # BLD: 4.8 K/UL — SIGNIFICANT CHANGE UP (ref 3.8–10.5)
WBC # FLD AUTO: 4.8 K/UL — SIGNIFICANT CHANGE UP (ref 3.8–10.5)

## 2025-07-31 PROCEDURE — 99284 EMERGENCY DEPT VISIT MOD MDM: CPT | Mod: 25

## 2025-07-31 RX ORDER — SODIUM CHLORIDE 9 G/1000ML
1000 INJECTION, SOLUTION INTRAVENOUS ONCE
Refills: 0 | Status: COMPLETED | OUTPATIENT
Start: 2025-07-31 | End: 2025-07-31

## 2025-07-31 RX ORDER — CHLORDIAZEPOXIDE HCL 10 MG
50 CAPSULE ORAL ONCE
Refills: 0 | Status: DISCONTINUED | OUTPATIENT
Start: 2025-07-31 | End: 2025-07-31

## 2025-07-31 RX ORDER — CHLORDIAZEPOXIDE HCL 10 MG
1 CAPSULE ORAL
Qty: 15 | Refills: 0
Start: 2025-07-31 | End: 2025-08-03

## 2025-07-31 RX ORDER — DIAZEPAM 5 MG/1
10 TABLET ORAL ONCE
Refills: 0 | Status: DISCONTINUED | OUTPATIENT
Start: 2025-07-31 | End: 2025-07-31